# Patient Record
Sex: MALE | Race: WHITE | NOT HISPANIC OR LATINO | URBAN - METROPOLITAN AREA
[De-identification: names, ages, dates, MRNs, and addresses within clinical notes are randomized per-mention and may not be internally consistent; named-entity substitution may affect disease eponyms.]

---

## 2018-01-13 NOTE — MISCELLANEOUS
Message  Please contact our office at [phone] to schedule a consultation with a physician for a follow up from the hospital     Thank you,  [facility]      Signatures   Electronically signed by : Abdon Corbett, ; Sep 15 2016  9:23AM EST                       (Author)

## 2019-06-02 ENCOUNTER — OFFICE VISIT (OUTPATIENT)
Dept: URGENT CARE | Age: 74
End: 2019-06-02
Payer: COMMERCIAL

## 2019-06-02 VITALS
RESPIRATION RATE: 18 BRPM | OXYGEN SATURATION: 94 % | WEIGHT: 163 LBS | HEART RATE: 98 BPM | SYSTOLIC BLOOD PRESSURE: 158 MMHG | DIASTOLIC BLOOD PRESSURE: 91 MMHG | TEMPERATURE: 97.7 F | BODY MASS INDEX: 22.73 KG/M2

## 2019-06-02 DIAGNOSIS — K08.89 PAIN, DENTAL: Primary | ICD-10-CM

## 2019-06-02 PROCEDURE — 99203 OFFICE O/P NEW LOW 30 MIN: CPT | Performed by: PHYSICIAN ASSISTANT

## 2019-06-02 RX ORDER — METOPROLOL SUCCINATE 25 MG/1
TABLET, EXTENDED RELEASE ORAL
COMMUNITY
Start: 2019-03-07 | End: 2020-05-01 | Stop reason: SDUPTHER

## 2019-06-02 RX ORDER — TRAMADOL HYDROCHLORIDE 50 MG/1
50 TABLET ORAL EVERY 8 HOURS PRN
Refills: 0 | COMMUNITY
Start: 2019-05-02 | End: 2019-08-21 | Stop reason: SDUPTHER

## 2019-06-02 RX ORDER — HYDROCODONE BITARTRATE AND ACETAMINOPHEN 10; 325 MG/1; MG/1
1 TABLET ORAL
Refills: 0 | COMMUNITY
Start: 2019-05-10 | End: 2019-08-27 | Stop reason: SDUPTHER

## 2019-06-02 RX ORDER — ZOLPIDEM TARTRATE 10 MG/1
10 TABLET ORAL DAILY PRN
COMMUNITY
End: 2019-10-09 | Stop reason: SDUPTHER

## 2019-06-02 RX ORDER — PENICILLIN V POTASSIUM 500 MG/1
500 TABLET ORAL EVERY 6 HOURS SCHEDULED
Qty: 28 TABLET | Refills: 0 | Status: SHIPPED | OUTPATIENT
Start: 2019-06-02 | End: 2019-06-09

## 2019-06-02 RX ORDER — ALBUTEROL SULFATE 90 UG/1
AEROSOL, METERED RESPIRATORY (INHALATION)
COMMUNITY
Start: 2013-10-31 | End: 2019-09-03 | Stop reason: SDUPTHER

## 2019-06-02 RX ORDER — RIVAROXABAN 20 MG/1
TABLET, FILM COATED ORAL
COMMUNITY
Start: 2019-04-09 | End: 2020-03-11

## 2019-09-20 PROBLEM — J44.9 COPD (CHRONIC OBSTRUCTIVE PULMONARY DISEASE) (HCC): Status: ACTIVE | Noted: 2019-06-16

## 2019-09-20 PROBLEM — K21.9 GERD (GASTROESOPHAGEAL REFLUX DISEASE): Status: ACTIVE | Noted: 2019-09-09

## 2019-09-20 PROBLEM — E11.29 TYPE 2 DIABETES MELLITUS WITH MICROALBUMINURIA, WITHOUT LONG-TERM CURRENT USE OF INSULIN (HCC): Status: ACTIVE | Noted: 2019-09-09

## 2019-09-20 PROBLEM — B37.81 CANDIDA ESOPHAGITIS (HCC): Status: ACTIVE | Noted: 2019-09-13

## 2019-09-20 PROBLEM — I25.10 CORONARY ARTERY DISEASE INVOLVING NATIVE CORONARY ARTERY: Status: ACTIVE | Noted: 2018-01-31

## 2019-09-20 PROBLEM — R80.9 TYPE 2 DIABETES MELLITUS WITH MICROALBUMINURIA, WITHOUT LONG-TERM CURRENT USE OF INSULIN (HCC): Status: ACTIVE | Noted: 2019-09-09

## 2019-09-20 PROBLEM — I10 HYPERTENSION, ESSENTIAL: Status: ACTIVE | Noted: 2017-01-01

## 2019-09-20 PROBLEM — D72.829 LEUKOCYTOSIS: Status: ACTIVE | Noted: 2018-01-31

## 2019-09-20 PROBLEM — N40.0 BPH (BENIGN PROSTATIC HYPERPLASIA): Status: ACTIVE | Noted: 2019-09-09

## 2019-09-20 PROBLEM — N18.31 CKD STAGE G3A/A2, GFR 45-59 AND ALBUMIN CREATININE RATIO 30-299 MG/G (HCC): Status: ACTIVE | Noted: 2019-08-18

## 2019-09-20 PROBLEM — J44.1 ACUTE EXACERBATION OF CHRONIC OBSTRUCTIVE PULMONARY DISEASE (HCC): Status: ACTIVE | Noted: 2019-05-12

## 2020-01-27 PROBLEM — K22.2 ESOPHAGEAL STRICTURE: Status: ACTIVE | Noted: 2019-09-09

## 2020-01-27 PROBLEM — G47.00 INSOMNIA: Status: ACTIVE | Noted: 2019-09-09

## 2020-03-11 NOTE — PRE-PROCEDURE INSTRUCTIONS
Pre-Surgery Instructions:   Medication Instructions    albuterol (2 5 mg/3 mL) 0 083 % nebulizer solution Instructed patient per Anesthesia Guidelines   Empagliflozin (JARDIANCE) 10 MG TABS Instructed patient per Anesthesia Guidelines  holding 3/16    pantoprazole (PROTONIX) 40 mg tablet Instructed patient per Anesthesia Guidelines   roflumilast (DALIRESP) 500 mcg tablet Instructed patient per Anesthesia Guidelines   senna (SENOKOT) 8 6 MG tablet Instructed patient per Anesthesia Guidelines   tamsulosin (FLOMAX) 0 4 mg Instructed patient per Anesthesia Guidelines  holding 3/16    zolpidem (AMBIEN) 10 mg tablet Instructed patient per Anesthesia Guidelines  Acetaminophen Med Class     Continue to take this medication on your normal schedule  If this is an oral medication and you take it in the morning, then you may take this medicine with a sip of water  Alpha-1 adrenergic blocker Med Class     Continue to take this medication on your normal schedule  If this is an oral medication and you take it in the morning, then you may take this medicine with a sip of water  Beta blocker Med Class     Continue to take this heart medication on your normal schedule  If this is an oral medication and you take it in the morning, then you may take this medicine with a sip of water  Inhalational Med Class     Continue to take these inhaler medications on your normal schedule up to and including the day of surgery  Insulin Med Class     Pre-Surgery/Procedure Instructions for Adult Patients who Take Medicine for Diabetes or to Control their Blood Sugar     Day Before Surgery/Procedure  Use the directions based on the type of medicine you take for your diabetes  1  If you are having a procedure that does not require a bowel prep:  ? Pre-Mixed Insulin (Intermediate Acting: Humalog 75/25, Humulin 70/30  Novolog 70/30, Regular Insulin)  § Take ½ your regular dose the evening before your procedure    ? Rapid/Fast Acting Insulin/Long Acting Insulin (Humalog U200, NovoLog, Apidra, Lantus, Levemir, Kelle Coffin, Raymond)  § Take your FULL regular dose the day before procedure  ? Oral Diabetic Medicines including Glipizide/Glimepiride/Glucotrol (sulfonylurea)  § Take your regular dose with dinner the evening before your procedure  2  If you are having a procedure (e g  Colonoscopy) that requires a bowel prep and you are allowed to have at least a clear liquid diet:  ? Pre-Mixed Insulin (Intermediate Acting: Humalog 75/25, Humulin 70/30, Novolog 70/30, Regular Insulin)  § Take ½ your regular dose the evening before your procedure  ? Rapid/Fast Acting Insulin (Humalog U200, NovoLog, Apidra, Fiasp)  § Take ½ your regular dose the evening before your procedure  ? Long Acting Insulin (Lantus, Levemir, Kelle Coffin)  § Take your FULL regular dose the day before procedure  ? Oral Glipizide/Glimepiride/Glucotrol (sulfonylurea)  § Take ½ your regular dose the evening before your procedure  ? Oral Diabetic Medicines that are NOT Glipizide/Glimepiride/Glucotrol  § Take your regular dose with dinner in the evening before your procedure      Day of Surgery/Procedure  · Long Acting Insulin (Lantus, Levemir, Kelle Coffin)  ? If you usually take your Long-Acting Insulin in the morning, take the full dose as scheduled  · With the exception of the morning Long-Acting Insulin noted above, DO NOT take ANY diabetic medicine on the day of your procedure unless you were instructed by the doctor who manages your diabetic medicines  · Continue to check your blood sugars  · If you have an insulin pump then consult with your Endocrinologist for instructions  · If you cannot see your Endocrinologist, on the day of the procedure set your insulin pump to your basal rate only   Please bring your insulin pump supplies to the hospital      This Educational material has been approved by the Patient Education Advisory Committee  Date prepared: 1/17/2018          Expiration date: 1/17/2019        Approval Number:                     NSAID Med Class     Stop taking this medication at least 3 days prior to surgery/procedure  Opioid Med Class     Continue to take this medication on your normal schedule  If this is an oral medication and you take it in the morning, then you may take this medicine with a sip of water  Zolpidem Med Class     Continue this medication up to the evening before surgery/procedure, but do not take the morning of the day of surgery  pre op instructions reviewed with pt on 3/11    Meds bathing and hospital instructions reviewed with understanding at this time

## 2020-03-15 ENCOUNTER — ANESTHESIA EVENT (OUTPATIENT)
Dept: PERIOP | Facility: HOSPITAL | Age: 75
End: 2020-03-15
Payer: COMMERCIAL

## 2020-03-15 NOTE — ANESTHESIA PREPROCEDURE EVALUATION
Review of Systems/Medical History  Patient summary reviewed  Chart reviewed  No history of anesthetic complications     Cardiovascular  Exercise tolerance (METS): >4,  Hypertension controlled, CAD , Aortic disease (Ascending aortic aneurysm (~4cm)),   Comment: Echo (9/10/19): Normal LV size and systolic function; EF ~63% aortic sclerosis w/o stenosis, trace MR and TR  Mild diastolic dysfunction  Normal stress test (8/2019), PE (h/o PE (2018)),  Pulmonary  Smoker (quit 2018) ex-smoker  , COPD (severe COPD (FEV1 34%)) ,        GI/Hepatic    GERD well controlled,        Kidney disease CKD, Chronic kidney disease stage 3, Prostatic disorder, benign prostatic hyperplasia       Endo/Other  Diabetes well controlled type 2 Oral agent,      GYN       Hematology  Anemia ,     Musculoskeletal  Back pain , cervical pain,        Neurology  Negative neurology ROS      Psychology   Negative psychology ROS   Chronic pain,            Physical Exam    Airway    Mallampati score: II  TM Distance: >3 FB  Neck ROM: full     Dental   Comment: Poor dentition - most teeth are missing or broken,     Cardiovascular  Rhythm: regular, Rate: normal, Cardiovascular exam normal    Pulmonary  Pulmonary exam normal Breath sounds clear to auscultation,     Other Findings        Anesthesia Plan  ASA Score- 3     Anesthesia Type- general with ASA Monitors  Additional Monitors:   Airway Plan: ETT  Plan Factors-    Induction- intravenous  Postoperative Plan- Plan for postoperative opioid use  Planned trial extubation    Informed Consent- Anesthetic plan and risks discussed with patient  I personally reviewed this patient with the CRNA  Discussed and agreed on the Anesthesia Plan with the CRNA           NPO and allergies verified  Patient took metoprolol this AM (3/16/20)  Preop glucose was 98 this AM     Plan:  GETA    Risks and benefits discussed with patient  Questions answered  Patient consented

## 2020-03-16 ENCOUNTER — HOSPITAL ENCOUNTER (OUTPATIENT)
Facility: HOSPITAL | Age: 75
Setting detail: OUTPATIENT SURGERY
Discharge: HOME | End: 2020-03-16
Attending: DENTIST | Admitting: DENTIST
Payer: COMMERCIAL

## 2020-03-16 ENCOUNTER — ANESTHESIA (OUTPATIENT)
Dept: PERIOP | Facility: HOSPITAL | Age: 75
End: 2020-03-16
Payer: COMMERCIAL

## 2020-03-16 VITALS
HEART RATE: 68 BPM | TEMPERATURE: 98.1 F | OXYGEN SATURATION: 96 % | HEIGHT: 71 IN | RESPIRATION RATE: 18 BRPM | BODY MASS INDEX: 23.1 KG/M2 | DIASTOLIC BLOOD PRESSURE: 86 MMHG | SYSTOLIC BLOOD PRESSURE: 147 MMHG | WEIGHT: 165 LBS

## 2020-03-16 DIAGNOSIS — K02.9 DENTAL CARIES, UNSPECIFIED: Primary | ICD-10-CM

## 2020-03-16 LAB
GLUCOSE SERPL-MCNC: 154 MG/DL (ref 65–140)
GLUCOSE SERPL-MCNC: 98 MG/DL (ref 65–140)

## 2020-03-16 PROCEDURE — 82948 REAGENT STRIP/BLOOD GLUCOSE: CPT

## 2020-03-16 PROCEDURE — NC001 PR NO CHARGE: Performed by: ANESTHESIOLOGY

## 2020-03-16 RX ORDER — SUCCINYLCHOLINE/SOD CL,ISO/PF 100 MG/5ML
SYRINGE (ML) INTRAVENOUS AS NEEDED
Status: DISCONTINUED | OUTPATIENT
Start: 2020-03-16 | End: 2020-03-16 | Stop reason: SURG

## 2020-03-16 RX ORDER — PROPOFOL 10 MG/ML
INJECTION, EMULSION INTRAVENOUS AS NEEDED
Status: DISCONTINUED | OUTPATIENT
Start: 2020-03-16 | End: 2020-03-16 | Stop reason: SURG

## 2020-03-16 RX ORDER — DEXAMETHASONE SODIUM PHOSPHATE 10 MG/ML
INJECTION, SOLUTION INTRAMUSCULAR; INTRAVENOUS AS NEEDED
Status: DISCONTINUED | OUTPATIENT
Start: 2020-03-16 | End: 2020-03-16 | Stop reason: SURG

## 2020-03-16 RX ORDER — OXYCODONE HYDROCHLORIDE AND ACETAMINOPHEN 5; 325 MG/1; MG/1
1 TABLET ORAL EVERY 4 HOURS PRN
Status: DISCONTINUED | OUTPATIENT
Start: 2020-03-16 | End: 2020-03-16 | Stop reason: HOSPADM

## 2020-03-16 RX ORDER — LIDOCAINE HYDROCHLORIDE 10 MG/ML
0.5 INJECTION, SOLUTION EPIDURAL; INFILTRATION; INTRACAUDAL; PERINEURAL ONCE AS NEEDED
Status: DISCONTINUED | OUTPATIENT
Start: 2020-03-16 | End: 2020-03-16 | Stop reason: HOSPADM

## 2020-03-16 RX ORDER — ONDANSETRON 2 MG/ML
INJECTION INTRAMUSCULAR; INTRAVENOUS AS NEEDED
Status: DISCONTINUED | OUTPATIENT
Start: 2020-03-16 | End: 2020-03-16 | Stop reason: SURG

## 2020-03-16 RX ORDER — GLYCOPYRROLATE 0.2 MG/ML
INJECTION INTRAMUSCULAR; INTRAVENOUS AS NEEDED
Status: DISCONTINUED | OUTPATIENT
Start: 2020-03-16 | End: 2020-03-16 | Stop reason: SURG

## 2020-03-16 RX ORDER — MORPHINE SULFATE 10 MG/ML
4 INJECTION, SOLUTION INTRAMUSCULAR; INTRAVENOUS EVERY 4 HOURS PRN
Status: DISCONTINUED | OUTPATIENT
Start: 2020-03-16 | End: 2020-03-16 | Stop reason: HOSPADM

## 2020-03-16 RX ORDER — HYDROMORPHONE HCL/PF 1 MG/ML
0.25 SYRINGE (ML) INJECTION
Status: COMPLETED | OUTPATIENT
Start: 2020-03-16 | End: 2020-03-16

## 2020-03-16 RX ORDER — FENTANYL CITRATE/PF 50 MCG/ML
25 SYRINGE (ML) INJECTION
Status: DISCONTINUED | OUTPATIENT
Start: 2020-03-16 | End: 2020-03-16 | Stop reason: HOSPADM

## 2020-03-16 RX ORDER — IBUPROFEN 400 MG/1
800 TABLET ORAL ONCE
Status: COMPLETED | OUTPATIENT
Start: 2020-03-16 | End: 2020-03-16

## 2020-03-16 RX ORDER — MORPHINE SULFATE 10 MG/ML
4 INJECTION, SOLUTION INTRAMUSCULAR; INTRAVENOUS EVERY 4 HOURS PRN
Status: DISCONTINUED | OUTPATIENT
Start: 2020-03-16 | End: 2020-03-16 | Stop reason: CLARIF

## 2020-03-16 RX ORDER — LIDOCAINE HYDROCHLORIDE 10 MG/ML
INJECTION, SOLUTION EPIDURAL; INFILTRATION; INTRACAUDAL; PERINEURAL AS NEEDED
Status: DISCONTINUED | OUTPATIENT
Start: 2020-03-16 | End: 2020-03-16 | Stop reason: SURG

## 2020-03-16 RX ORDER — ONDANSETRON 2 MG/ML
4 INJECTION INTRAMUSCULAR; INTRAVENOUS ONCE AS NEEDED
Status: DISCONTINUED | OUTPATIENT
Start: 2020-03-16 | End: 2020-03-16 | Stop reason: HOSPADM

## 2020-03-16 RX ORDER — LIDOCAINE HYDROCHLORIDE AND EPINEPHRINE 10; 10 MG/ML; UG/ML
INJECTION, SOLUTION INFILTRATION; PERINEURAL AS NEEDED
Status: DISCONTINUED | OUTPATIENT
Start: 2020-03-16 | End: 2020-03-16 | Stop reason: HOSPADM

## 2020-03-16 RX ORDER — SODIUM CHLORIDE, SODIUM LACTATE, POTASSIUM CHLORIDE, CALCIUM CHLORIDE 600; 310; 30; 20 MG/100ML; MG/100ML; MG/100ML; MG/100ML
100 INJECTION, SOLUTION INTRAVENOUS CONTINUOUS
Status: DISCONTINUED | OUTPATIENT
Start: 2020-03-16 | End: 2020-03-16 | Stop reason: HOSPADM

## 2020-03-16 RX ORDER — SODIUM CHLORIDE, SODIUM LACTATE, POTASSIUM CHLORIDE, CALCIUM CHLORIDE 600; 310; 30; 20 MG/100ML; MG/100ML; MG/100ML; MG/100ML
75 INJECTION, SOLUTION INTRAVENOUS CONTINUOUS
Status: DISCONTINUED | OUTPATIENT
Start: 2020-03-16 | End: 2020-03-16 | Stop reason: HOSPADM

## 2020-03-16 RX ORDER — OXYCODONE HYDROCHLORIDE AND ACETAMINOPHEN 5; 325 MG/1; MG/1
1 TABLET ORAL EVERY 6 HOURS PRN
Qty: 12 TABLET | Refills: 0 | Status: SHIPPED | OUTPATIENT
Start: 2020-03-16 | End: 2020-03-19

## 2020-03-16 RX ORDER — IBUPROFEN 800 MG/1
800 TABLET ORAL EVERY 8 HOURS PRN
Qty: 30 TABLET | Refills: 0 | Status: SHIPPED | OUTPATIENT
Start: 2020-03-16

## 2020-03-16 RX ORDER — FENTANYL CITRATE 50 UG/ML
INJECTION, SOLUTION INTRAMUSCULAR; INTRAVENOUS AS NEEDED
Status: DISCONTINUED | OUTPATIENT
Start: 2020-03-16 | End: 2020-03-16 | Stop reason: SURG

## 2020-03-16 RX ORDER — NEOSTIGMINE METHYLSULFATE 1 MG/ML
INJECTION INTRAVENOUS AS NEEDED
Status: DISCONTINUED | OUTPATIENT
Start: 2020-03-16 | End: 2020-03-16 | Stop reason: SURG

## 2020-03-16 RX ORDER — ROCURONIUM BROMIDE 10 MG/ML
INJECTION, SOLUTION INTRAVENOUS AS NEEDED
Status: DISCONTINUED | OUTPATIENT
Start: 2020-03-16 | End: 2020-03-16 | Stop reason: SURG

## 2020-03-16 RX ADMIN — PROPOFOL 150 MG: 10 INJECTION, EMULSION INTRAVENOUS at 13:17

## 2020-03-16 RX ADMIN — GLYCOPYRROLATE 0.4 MG: 0.2 INJECTION, SOLUTION INTRAMUSCULAR; INTRAVENOUS at 13:55

## 2020-03-16 RX ADMIN — HYDROMORPHONE HYDROCHLORIDE 0.25 MG: 1 INJECTION, SOLUTION INTRAMUSCULAR; INTRAVENOUS; SUBCUTANEOUS at 14:45

## 2020-03-16 RX ADMIN — Medication 100 MG: at 13:18

## 2020-03-16 RX ADMIN — HYDROMORPHONE HYDROCHLORIDE 0.25 MG: 1 INJECTION, SOLUTION INTRAMUSCULAR; INTRAVENOUS; SUBCUTANEOUS at 14:52

## 2020-03-16 RX ADMIN — DEXAMETHASONE SODIUM PHOSPHATE 4 MG: 10 INJECTION, SOLUTION INTRAMUSCULAR; INTRAVENOUS at 13:21

## 2020-03-16 RX ADMIN — LIDOCAINE HYDROCHLORIDE 50 MG: 10 INJECTION, SOLUTION EPIDURAL; INFILTRATION; INTRACAUDAL; PERINEURAL at 13:17

## 2020-03-16 RX ADMIN — HYDROMORPHONE HYDROCHLORIDE 0.25 MG: 1 INJECTION, SOLUTION INTRAMUSCULAR; INTRAVENOUS; SUBCUTANEOUS at 15:12

## 2020-03-16 RX ADMIN — ONDANSETRON 4 MG: 2 INJECTION INTRAMUSCULAR; INTRAVENOUS at 13:56

## 2020-03-16 RX ADMIN — HYDROMORPHONE HYDROCHLORIDE 0.25 MG: 1 INJECTION, SOLUTION INTRAMUSCULAR; INTRAVENOUS; SUBCUTANEOUS at 15:00

## 2020-03-16 RX ADMIN — FENTANYL CITRATE 25 MCG: 50 INJECTION, SOLUTION INTRAMUSCULAR; INTRAVENOUS at 13:17

## 2020-03-16 RX ADMIN — HYDROMORPHONE HYDROCHLORIDE 0.25 MG: 1 INJECTION, SOLUTION INTRAMUSCULAR; INTRAVENOUS; SUBCUTANEOUS at 14:40

## 2020-03-16 RX ADMIN — FENTANYL CITRATE 25 MCG: 50 INJECTION INTRAMUSCULAR; INTRAVENOUS at 14:35

## 2020-03-16 RX ADMIN — FENTANYL CITRATE 25 MCG: 50 INJECTION, SOLUTION INTRAMUSCULAR; INTRAVENOUS at 14:08

## 2020-03-16 RX ADMIN — HYDROMORPHONE HYDROCHLORIDE 0.25 MG: 1 INJECTION, SOLUTION INTRAMUSCULAR; INTRAVENOUS; SUBCUTANEOUS at 15:05

## 2020-03-16 RX ADMIN — SODIUM CHLORIDE, SODIUM LACTATE, POTASSIUM CHLORIDE, AND CALCIUM CHLORIDE 75 ML/HR: .6; .31; .03; .02 INJECTION, SOLUTION INTRAVENOUS at 15:08

## 2020-03-16 RX ADMIN — NEOSTIGMINE METHYLSULFATE 3 MG: 1 INJECTION, SOLUTION INTRAVENOUS at 13:55

## 2020-03-16 RX ADMIN — OXYCODONE HYDROCHLORIDE AND ACETAMINOPHEN 1 TABLET: 5; 325 TABLET ORAL at 15:52

## 2020-03-16 RX ADMIN — SODIUM CHLORIDE, SODIUM LACTATE, POTASSIUM CHLORIDE, AND CALCIUM CHLORIDE 100 ML/HR: .6; .31; .03; .02 INJECTION, SOLUTION INTRAVENOUS at 11:45

## 2020-03-16 RX ADMIN — IBUPROFEN 800 MG: 400 TABLET ORAL at 16:37

## 2020-03-16 RX ADMIN — FENTANYL CITRATE 50 MCG: 50 INJECTION, SOLUTION INTRAMUSCULAR; INTRAVENOUS at 14:11

## 2020-03-16 RX ADMIN — PHENYLEPHRINE HYDROCHLORIDE 100 MCG: 10 INJECTION INTRAVENOUS at 13:20

## 2020-03-16 RX ADMIN — FENTANYL CITRATE 25 MCG: 50 INJECTION INTRAMUSCULAR; INTRAVENOUS at 14:30

## 2020-03-16 RX ADMIN — ROCURONIUM BROMIDE 30 MG: 50 INJECTION, SOLUTION INTRAVENOUS at 13:29

## 2020-03-16 NOTE — INTERVAL H&P NOTE
H&P reviewed  After examining the patient I find no changes in the patients condition since the H&P had been written  Plan today is to remove all remaining teeth, 13 in total, in the [facility] today    Lisandra MaeBO      Vitals:    03/16/20 1124   BP: 123/87   Pulse: 102   Resp: 16   Temp: 98 2 °F (36 8 °C)   SpO2: 98%

## 2020-03-16 NOTE — ANESTHESIA POSTPROCEDURE EVALUATION
Post-Op Assessment Note    CV Status:  Stable    Pain management: adequate     Mental Status:  Alert and awake   Hydration Status:  Euvolemic and stable   PONV Controlled:  None   Airway Patency:  Patent   Post Op Vitals Reviewed: Yes      Staff: Anesthesiologist, CRNA           /89 (03/16/20 1427)    Temp (!) 97 3 °F (36 3 °C) (03/16/20 1427)    Pulse 80 (03/16/20 1427)   Resp 15 (03/16/20 1427)    SpO2 97 % (03/16/20 1427)      Patient transported to PACU on supplemental O2  Patient was awake and alert, with stable vital signs  Signout was given to PACU RN

## 2020-03-16 NOTE — H&P
Patient seen and examined  Chart reviewed  No changes in patient's condition since H&P was last written

## 2020-03-16 NOTE — OP NOTE
OPERATIVE REPORT  PATIENT NAME: Sona Camacho    :  1945  MRN: [id number]  Pt Location: [facility] ROOM 02    SURGERY DATE: 3/16/2020    Surgeon(s) and Role:     * Chad Varela, DMD - Primary    Preop Diagnosis:  Dental caries, unspecified [K02 9]  Opioid use, unspecified, uncomplicated [H07 87]    Post-Op Diagnosis Codes:     * Dental caries, unspecified [K02 9]     * Opioid use, unspecified, uncomplicated [Y11 21]  Procedure:  Extraction of teeth #'s 2, 5, 6, 7, 8, 9, 11, 12, 13, 15, 22, 23, 24, 25, 26 and 27  Specimen(s):  * No specimens in log *    Estimated Blood Loss:   Minimal    Drains:  * No LDAs found *    Anesthesia Type:   General    Operative Indications:  Dental caries, unspecified [K02 9]  Opioid use, unspecified, uncomplicated [Y84 22]      Operative Findings: All fractured teeth, root tips remaining  Complications:   None    Procedure and Technique:  The patient was greeted at the bedside in the prep and hold area  I reviewed previously signed informed consent  All questions regarding extraction of teeth #2, 5, 6, 7, 8, 9, 11, 12, 13, 15, 22, 23, 24, 25, 26 and 27 in preparation for full upper and lower dentures  The patient was brought to the main operating room and placed in a supine position on the operating room table  A time-out was performed with the Surgical, Nursing and Anesthesia staff verifying the patient, procedure and laterality  Anesthesia placed appropriate monitors on the patient and he was intubated without issue  The patient was draped in the usual sterile fashion  Throat pack was moist and placed in the posterior oropharynx  13 cc of 1% lidocaine with 1:100,000 epinephrine was used to anesthetize the surgical sites  Attention was first made to the maxilla  A 15 blade was used to make an alveolar incision from maxilla right to maxilla left  A full thickness mucoperiosteal flap was elevated    The roots of teeth 2, 5, 6, 7, 8, 9, 11, 12, 13 and 15 were identified  All roots were luxated and extracted with a ronguer  Copious normal saline irrigation of the flap and sockets was performed  The maxillary flap was reapproximated with 3-0 chromic gut suture in a running locking fashion  Positive hemostasis was achieved  Attention was first made to the mandible  A 15 blade was used to make an alveolar incision from mandible right to mandible left  A full thickness mucoperiosteal flap was reflected and the roots of teeth 22, 23, 24, 25, 26 and 27 were identified  All roots were luxated and extracted with a ronguer  Copious normal saline irrigation of the flap and sockets was performed  The mandibular flap was reapproximated with 3-0 chromic gut sutures in a running, locking fashion  Positive hemostasis was achieved  The throat pack was removed and the oral cavity was suctioned  Sterile drapes were removed and the face was cleansed with normal saline and dried  Patient emerged from anesthesia and was transported to the post anesthesia care unit  All sponge and needle counts were correct and verified with the nursing staff  No complications encountered   I was present for the entire procedure     I was present for the entire procedure    Patient Disposition:  PACU  and extubated and stable    SIGNATURE: Jag Doherty DMD  DATE: March 16, 2020  TIME: 2:15 PM

## 2020-03-16 NOTE — ADDENDUM NOTE
Addendum  created 03/16/20 1541 by Crystal Rivera CRNA    Intraprocedure Event edited, Intraprocedure Staff edited

## 2020-03-16 NOTE — DISCHARGE INSTRUCTIONS
POST OPERATIVE INSTRUCTIONS FOLLOWING ORAL SURGERY    Swelling: To reduce swelling, place ice bag on your face up to 12 hours following surgery  This is an important factor in keeping swelling to a minimum  Swelling is common and need not cause alarm  Rinsing: DO NOT RINSE for the first 12 hours after surgery  After 24 hours it is important to rinse, using warm salt water (not over the counter mouthwash) 3 to 4 times a day, particularly after eating  Brush areas of mouth not affected by the surgery starting tomorrow  Spitting: DO NOT SPIT OUT frequent spitting will cause bleeding to continue  Exercise Jaw: In some cases following oral surgery, it becomes difficult to open your mouth  Exercise your jaw frequently by attempting to open your mouth wide  You may experience discomfort at first, however, with continued exercise the discomfort is reduced  Diet: After having oral surgery it is recommended the patient maintain a semi-liquid diet for 24 hours  A regular diet should be resumed as soon as possible, avoiding peanuts, pretzels, and foods with seeds  Vomiting: Occasionally, patients will have nausea after surgery  Tea, ginger ale, and soup broth will help this complication  Pain: A prescription for pain relieving drugs is given when surgery is extensive  For lesser surgical procedures, it is recommended the patient use Motrin or Advil  If you are in pain and the drug you are taking does not help, please contact us and we will try to remedy the situation  Bleeding: Bite on gauze for 30 minutes  If the bleeding continues, bite on new gauze for an additional 30 minutes  If bleeding continues, place a damp tea bag over the socket and continue to bite down for an additional 30 minutes  Frequent gauze changes allow bleeding to continue  Smoking: It is important you DO NOT SMOKE after surgery  Smoking caused dry socket, which is very painful      Concerns: May call [facility] for Oral Surgery and Implantology at [phone]  Emergency: Go to the [address] at [facility] and ask for the Oral Surgeon on call  DO NOT WAIT until your post-operative appointment to consult [facility] [phone]

## 2020-03-18 PROBLEM — I26.99 PULMONARY EMBOLISM (HCC): Status: ACTIVE | Noted: 2020-03-18

## 2020-03-24 ENCOUNTER — HOSPITAL ENCOUNTER (EMERGENCY)
Facility: HOSPITAL | Age: 75
Discharge: HOME | End: 2020-03-25
Attending: EMERGENCY MEDICINE | Admitting: EMERGENCY MEDICINE
Payer: COMMERCIAL

## 2020-03-24 VITALS
DIASTOLIC BLOOD PRESSURE: 85 MMHG | RESPIRATION RATE: 18 BRPM | OXYGEN SATURATION: 98 % | HEART RATE: 100 BPM | SYSTOLIC BLOOD PRESSURE: 136 MMHG | TEMPERATURE: 98.3 F

## 2020-03-24 DIAGNOSIS — K08.89 PAIN, DENTAL: Primary | ICD-10-CM

## 2020-03-24 PROCEDURE — 99282 EMERGENCY DEPT VISIT SF MDM: CPT

## 2020-03-24 PROCEDURE — 99284 EMERGENCY DEPT VISIT MOD MDM: CPT | Performed by: EMERGENCY MEDICINE

## 2020-03-24 RX ORDER — OXYCODONE HYDROCHLORIDE 5 MG/1
5 TABLET ORAL EVERY 6 HOURS PRN
Qty: 12 TABLET | Refills: 0 | Status: SHIPPED | OUTPATIENT
Start: 2020-03-24 | End: 2020-10-07 | Stop reason: ALTCHOICE

## 2020-03-25 NOTE — DISCHARGE INSTRUCTIONS
You came to the emergency room tonight with persistent pain after dental procedure after you ran out of medication  We are sending a prescription for medications to the below pharmacy that can be picked up at any time  Please take as directed  Please return for significantly worsening pain, fevers or chills, redness or swelling extending to your jaw, difficulty opening your mouth fully due to pain, or any other concerning signs or symptoms  Please follow-up with oral maxillofacial surgery on Monday  Please follow-up with your primary care provider  Please refer to the following documents for additional instructions and return precautions

## 2020-03-25 NOTE — ED PROVIDER NOTES
History  Chief Complaint   Patient presents with    Dental Pain     pt states that he had multiple teeth pulled on 3/16 and ran out of his oxycodone and is now having pain in his mouth  states he took ibuprofen 7 hours ago it did not help     72-year-old male past history of CAD, COPD, CKD, diabetes type 2 presenting with several day history of mouth pain  Patient had all of his teeth and roots pulled back on the 16th and was given 12 Percocet 5-325  Patient stated the medication is working well but that he ran out of it and is back today because of worsening pain and he is asking for a prescription to hold him over until his appointment on Monday  Patient complaining of oral pain  He denies any fevers chills  He is able to open his mouth completely and denies any trismus  No edema or swelling along his jaw or down his neck  No other complaints  Patient stated he is able to tolerate a soft diet well mainly consisting of soup video Denies any headache, vision changes, chest pain or shortness of breath, abdominal pain, nausea/vomiting, or any bladder or bowel changes  Prior to Admission Medications   Prescriptions Last Dose Informant Patient Reported? Taking?    Empagliflozin (JARDIANCE) 10 MG TABS   No No   Sig: Take 1 tablet (10 mg total) by mouth every morning   HYDROcodone-acetaminophen (NORCO)  mg per tablet   No No   Sig: Take 1 tablet by mouth every 6 (six) hours as needed for moderate pain every 4 to 6 hours as needed for painMax Daily Amount: 4 tablets   albuterol (2 5 mg/3 mL) 0 083 % nebulizer solution  Self No No   Sig: Take 1 vial (2 5 mg total) by nebulization every 4 (four) hours as needed for wheezing   albuterol (PROVENTIL HFA) 90 mcg/act inhaler  Self No No   Sig: Inhale 2 puffs every 6 (six) hours as needed for wheezing   diclofenac (VOLTAREN) 75 mg EC tablet  Self No No   Sig: Take 1 tablet (75 mg total) by mouth 2 (two) times a day   ibuprofen (MOTRIN) 800 mg tablet   No No Sig: Take 1 tablet (800 mg total) by mouth every 8 (eight) hours as needed for moderate pain   metoprolol succinate (TOPROL-XL) 25 mg 24 hr tablet  Self Yes No   pantoprazole (PROTONIX) 40 mg tablet   No No   Sig: Take 1 tablet (40 mg total) by mouth daily   roflumilast (DALIRESP) 500 mcg tablet   No No   Sig: Take 1 tablet (500 mcg total) by mouth daily   senna (SENOKOT) 8 6 MG tablet  Self Yes No   Sig: Take 1 tablet by mouth daily   tamsulosin (FLOMAX) 0 4 mg   No No   Sig: Take 1 capsule (0 4 mg total) by mouth daily with dinner   zolpidem (AMBIEN) 10 mg tablet   No No   Sig: Take 1 tablet (10 mg total) by mouth daily at bedtime      Facility-Administered Medications: None       Past Medical History:   Diagnosis Date    COPD (chronic obstructive pulmonary disease) ([facility] [address] )     Hypertension        Past Surgical History:   Procedure Laterality Date    EGD      MULTIPLE TOOTH EXTRACTIONS N/A 3/16/2020    Procedure: EXTRACTION OF TEETH# 2, 5, 6, 7, 8, 9, 11, 12, 13, 15, 22, 23, 24, 25, 26, 27;  Surgeon: Bon Varma DMD;  Location: [facility];  Service: Maxillofacial    TOOTH EXTRACTION         Family History   Problem Relation Age of Onset    No Known Problems Mother     No Known Problems Father      I have reviewed and agree with the history as documented  E-Cigarette/Vaping    E-Cigarette Use Never User      E-Cigarette/Vaping Substances    Nicotine No     THC No     CBD No     Flavoring No     Other No     Unknown No      Social History     Tobacco Use    Smoking status: Former Smoker    Smokeless tobacco: Former User   Substance Use Topics    Alcohol use: No    Drug use: No        Review of Systems   Constitutional: Negative for appetite change, chills, diaphoresis, fever and unexpected weight change  HENT: Positive for dental problem  Negative for congestion and rhinorrhea  Eyes: Negative for photophobia and visual disturbance     Respiratory: Negative for cough, chest tightness and shortness of breath  Cardiovascular: Negative for chest pain, palpitations and leg swelling  Gastrointestinal: Negative for abdominal distention, abdominal pain, blood in stool, constipation, diarrhea, nausea and vomiting  Genitourinary: Negative for dysuria and hematuria  Musculoskeletal: Negative for back pain, joint swelling, neck pain and neck stiffness  Skin: Negative for color change, pallor, rash and wound  Neurological: Negative for dizziness, syncope, weakness, light-headedness and headaches  Psychiatric/Behavioral: Negative for agitation  All other systems reviewed and are negative  Physical Exam  ED Triage Vitals [03/24/20 2335]   Temperature Pulse Respirations Blood Pressure SpO2   98 3 °F (36 8 °C) 100 18 136/85 98 %      Temp Source Heart Rate Source Patient Position - Orthostatic VS BP Location FiO2 (%)   Oral -- -- -- --      Pain Score       Worst Possible Pain             Orthostatic Vital Signs  Vitals:    03/24/20 2335   BP: 136/85   Pulse: 100       Physical Exam   Constitutional: He is oriented to person, place, and time  He appears well-developed and well-nourished  HENT:   Head: Normocephalic and atraumatic  Nose: Nose normal    Mouth/Throat: Oropharynx is clear and moist    Oropharynx without any teeth  Gum line with intermittent sutures without any signs of erythema, purulence, edema  Tender palpation along gumline  No active bleeding  No edema or tenderness to palpation along external jaw  Not tracking down neck  No lymphadenopathy   Eyes: Pupils are equal, round, and reactive to light  EOM are normal  Right eye exhibits no discharge  Left eye exhibits no discharge  Neck: Normal range of motion  Neck supple  No JVD present  No tracheal deviation present  Cardiovascular: Normal rate, regular rhythm, normal heart sounds and intact distal pulses  Exam reveals no gallop and no friction rub  No murmur heard    Pulmonary/Chest: Effort normal and breath sounds normal  No stridor  No respiratory distress  He has no wheezes  He has no rales  He exhibits no tenderness  Abdominal: Soft  Bowel sounds are normal  He exhibits no distension  There is no tenderness  There is no rebound and no guarding  Musculoskeletal: Normal range of motion  He exhibits no edema, tenderness or deformity  Lymphadenopathy:     He has no cervical adenopathy  Neurological: He is alert and oriented to person, place, and time  No cranial nerve deficit or sensory deficit  He exhibits normal muscle tone  Coordination normal    Skin: Skin is warm and dry  No rash noted  He is not diaphoretic  No erythema  No pallor  Psychiatric: He has a normal mood and affect  His behavior is normal  Thought content normal    Nursing note and vitals reviewed  ED Medications  Medications - No data to display    Diagnostic Studies  Results Reviewed     None                 No orders to display         Procedures  Procedures      ED Course                                 MDM  Number of Diagnoses or Management Options  Pain, dental:   Diagnosis management comments: 82-year-old male past history of CAD, COPD, CKD, diabetes type 2 presenting with several day history of mouth pain  Patient presenting after oral surgery nor any as medications with persistent pain without any signs or symptoms of infection or systemic infection  Afebrile  Patient needing medication to hold him over until this coming Monday when he has his follow-up appointment with Oral maxillofacial surgery  Plan to send prescription for short course of oxycodone to his pharmacy  Advised to follow-up with primary care provider and OMFS  Patient discharged with instructions and return precautions         Amount and/or Complexity of Data Reviewed  Clinical lab tests: ordered and reviewed  Tests in the radiology section of CPT®: ordered and reviewed  Tests in the medicine section of CPT®: ordered and reviewed  Review and summarize past medical records: yes  Independent visualization of images, tracings, or specimens: yes    Risk of Complications, Morbidity, and/or Mortality  Presenting problems: low  Diagnostic procedures: minimal  Management options: minimal    Patient Progress  Patient progress: stable        Disposition  Final diagnoses:   Pain, dental     Time reflects when diagnosis was documented in both MDM as applicable and the Disposition within this note     Time User Action Codes Description Comment    3/24/2020 11:54 PM José Miguel Krause Add [K08 [id number]] Pain, dental       ED Disposition     ED Disposition Condition Date/Time Comment    Discharge Stable Wed Mar 25, 2020 12:12 AM Neisha Camacho discharge to home/self care              Follow-up Information     Follow up With Specialties Details Why Contact Info Additional Information    Beverly Corbett MD Family Medicine Schedule an appointment as soon as possible for a visit   [address] [place] [address] Drive [facility] [address] [place] [address] Emergency Department Emergency Medicine Go to  If symptoms worsen [address]  [phone]  ED, [address] [place] [address], [address]   [phone]          Discharge Medication List as of 3/25/2020 12:12 AM      START taking these medications    Details   oxyCODONE (ROXICODONE) 5 mg immediate release tablet Take 1 tablet (5 mg total) by mouth every 6 (six) hours as needed for moderate pain for up to 12 dosesMax Daily Amount: 20 mg, Starting Tue 3/24/2020, Normal         CONTINUE these medications which have NOT CHANGED    Details   albuterol (2 5 mg/3 mL) 0 083 % nebulizer solution Take 1 vial (2 5 mg total) by nebulization every 4 (four) hours as needed for wheezing, Starting Tue 8/13/2019, Normal      albuterol (PROVENTIL HFA) 90 mcg/act inhaler Inhale 2 puffs every 6 (six) hours as needed for wheezing, Starting Tue 9/3/2019, Normal      diclofenac (VOLTAREN) 75 mg EC tablet Take 1 tablet (75 mg total) by mouth 2 (two) times a day, Starting Tue 8/13/2019, Normal      Empagliflozin (JARDIANCE) 10 MG TABS Take 1 tablet (10 mg total) by mouth every morning, Starting Fri 9/20/2019, Normal      HYDROcodone-acetaminophen (NORCO)  mg per tablet Take 1 tablet by mouth every 6 (six) hours as needed for moderate pain every 4 to 6 hours as needed for painMax Daily Amount: 4 tablets, Starting Fri 3/13/2020, Normal      ibuprofen (MOTRIN) 800 mg tablet Take 1 tablet (800 mg total) by mouth every 8 (eight) hours as needed for moderate pain, Starting Mon 3/16/2020, Normal      metoprolol succinate (TOPROL-XL) 25 mg 24 hr tablet Starting Thu 3/7/2019, Historical Med      pantoprazole (PROTONIX) 40 mg tablet Take 1 tablet (40 mg total) by mouth daily, Starting Wed 10/23/2019, Normal      roflumilast (DALIRESP) 500 mcg tablet Take 1 tablet (500 mcg total) by mouth daily, Starting Mon 11/4/2019, Normal      senna (SENOKOT) 8 6 MG tablet Take 1 tablet by mouth daily, Historical Med      tamsulosin (FLOMAX) 0 4 mg Take 1 capsule (0 4 mg total) by mouth daily with dinner, Starting Wed 10/23/2019, Until Sat 10/17/2020, Normal      zolpidem (AMBIEN) 10 mg tablet Take 1 tablet (10 mg total) by mouth daily at bedtime, Starting Wed 3/18/2020, Normal           No discharge procedures on file  PDMP Review       Value Time User    PDMP Reviewed  Yes 3/24/2020 11:38 PM Sriram Irizarry MD           ED Provider  Attending physically available and evaluated Sona Camacho  ANGIE managed the patient along with the ED Attending      Electronically Signed by         Terrell Villalpando MD  03/25/20 0031

## 2020-03-25 NOTE — ED ATTENDING ATTESTATION
3/24/2020  IMonserrat MD, saw and evaluated the patient  I have discussed the patient with the resident/non-physician practitioner and agree with the resident's/non-physician practitioner's findings, Plan of Care, and MDM as documented in the resident's/non-physician practitioner's note, except where noted  All available labs and Radiology studies were reviewed  I was present for key portions of any procedure(s) performed by the resident/non-physician practitioner and I was immediately available to provide assistance  At this point I agree with the current assessment done in the Emergency Department  I have conducted an independent evaluation of this patient a history and physical is as follows:    ED Course     74-year-old male, history of recent dental extraction of all of his teeth, was prescribed oxycodone postoperatively, as presenting to the emergency department with a tightness discomfort in his bilateral upper and lower gums  Patient states that he takes hydrocodone and tramadol chronically for low back pain and states that those medications are not helping his mouth pain  Patient states that he has run out of the 12 oxycodone tablets that he was prescribed at the time of his procedure  Patient has scheduled follow-up with his oral surgeon in 4 days  No reported fever  The patient is drinking normally  Ten systems reviewed negative except as noted in history of present illness  On examination the patient has multiple sutures in his upper and lower gums  There is no evidence of swelling, erythema, or discharge to suggest infection  The submental space is soft  Patient has no trismus  Dental pain is status post dental extraction  Patient appears clinically well  A prescription was transmitted electronically to the [facility] on [address], because that was a pharmacy that was thought to be 24 hours    After the patient had been discharged, I received a phone call from a pharmacy on [facility] [address] [facility], stating that that was the only 24 hour pharmacy  I was unable to transmitted new prescription on this patient    Critical Care Time  Procedures

## 2022-01-19 PROBLEM — F11.20 CONTINUOUS OPIOID DEPENDENCE (HCC): Status: ACTIVE | Noted: 2022-01-19

## 2023-11-22 ENCOUNTER — PATIENT OUTREACH (OUTPATIENT)
Dept: CASE MANAGEMENT | Facility: OTHER | Age: 78
End: 2023-11-22

## 2023-11-22 NOTE — PROGRESS NOTES
In basket message received with patient referral from [facility] integrated care coordination. Chart reviewed. Patient was admitted to [facility] on 11/18 with Covid, sepsis, COPD,diabetes and hypertension. He discharged home on 11/20. I called and there was no answer or voicemail.

## 2023-11-24 ENCOUNTER — PATIENT OUTREACH (OUTPATIENT)
Dept: CASE MANAGEMENT | Facility: OTHER | Age: 78
End: 2023-11-24

## 2023-11-24 NOTE — PROGRESS NOTES
I called patient who reports he feels well. He denies shortness of breath. He reports he was contacted on 11/22 by Dr. Karma Cali for transition of care call from the [address] . The physician reviewed medications with patient and answered all of his questions. He thanked me for calling but declined further outreach.

## (undated) DEVICE — GLOVE SRG BIOGEL 7

## (undated) DEVICE — SYRINGE 10ML LL

## (undated) DEVICE — STERILE MANDIBLE PACK: Brand: CARDINAL HEALTH

## (undated) DEVICE — INTENDED FOR TISSUE SEPARATION, AND OTHER PROCEDURES THAT REQUIRE A SHARP SURGICAL BLADE TO PUNCTURE OR CUT.: Brand: BARD-PARKER ® CARBON RIB-BACK BLADES

## (undated) DEVICE — 2000CC GUARDIAN II: Brand: GUARDIAN

## (undated) DEVICE — NEEDLE 25G X 1 1/2